# Patient Record
Sex: FEMALE | URBAN - METROPOLITAN AREA
[De-identification: names, ages, dates, MRNs, and addresses within clinical notes are randomized per-mention and may not be internally consistent; named-entity substitution may affect disease eponyms.]

---

## 2024-07-03 ENCOUNTER — APPOINTMENT (RX ONLY)
Dept: URBAN - METROPOLITAN AREA OTHER 8 | Facility: OTHER | Age: 34
Setting detail: DERMATOLOGY
End: 2024-07-03

## 2024-07-03 DIAGNOSIS — L60.8 OTHER NAIL DISORDERS: ICD-10-CM

## 2024-07-03 PROCEDURE — ? COUNSELING

## 2024-07-03 PROCEDURE — 99202 OFFICE O/P NEW SF 15 MIN: CPT

## 2024-07-03 PROCEDURE — ? OBSERVATION AND MEASURE

## 2024-07-03 ASSESSMENT — LOCATION ZONE DERM
LOCATION ZONE: TOENAIL
LOCATION ZONE: FINGERNAIL

## 2024-07-03 ASSESSMENT — LOCATION DETAILED DESCRIPTION DERM
LOCATION DETAILED: LEFT RING FINGER LUNULA
LOCATION DETAILED: LEFT RING FINGERNAIL
LOCATION DETAILED: LEFT 3RD TOENAIL

## 2024-07-03 ASSESSMENT — LOCATION SIMPLE DESCRIPTION DERM
LOCATION SIMPLE: LEFT 3RD TOE
LOCATION SIMPLE: LEFT RING FINGERNAIL

## 2024-07-03 NOTE — PROCEDURE: COUNSELING
Addended by: GUANACO LAZO on: 8/23/2022 02:48 PM     Modules accepted: Orders    
Detail Level: Simple

## 2024-07-03 NOTE — PROCEDURE: OBSERVATION
Detail Level: Detailed
Size Of Lesion: 1/2 toenail plate
Morphology Per Location (Optional): homogeneous medium brown covering half nail
Size Of Lesion: 0.2 cm
Morphology Per Location (Optional): hemoganis light brown linear band